# Patient Record
Sex: FEMALE | Race: BLACK OR AFRICAN AMERICAN
[De-identification: names, ages, dates, MRNs, and addresses within clinical notes are randomized per-mention and may not be internally consistent; named-entity substitution may affect disease eponyms.]

---

## 2019-07-06 ENCOUNTER — HOSPITAL ENCOUNTER (EMERGENCY)
Dept: HOSPITAL 53 - M ED | Age: 24
LOS: 1 days | Discharge: HOME | End: 2019-07-07
Payer: MEDICAID

## 2019-07-06 VITALS — BODY MASS INDEX: 19.96 KG/M2 | HEIGHT: 62 IN | WEIGHT: 108.47 LBS

## 2019-07-06 DIAGNOSIS — Z3A.10: ICD-10-CM

## 2019-07-06 DIAGNOSIS — O26.891: Primary | ICD-10-CM

## 2019-07-06 DIAGNOSIS — R10.2: ICD-10-CM

## 2019-07-06 LAB
B-HCG SERPL-ACNC: (no result) MIU/ML
BASOPHILS # BLD AUTO: 0 10^3/UL (ref 0–0.2)
BASOPHILS NFR BLD AUTO: 0.4 % (ref 0–1)
BUN SERPL-MCNC: 8 MG/DL (ref 7–18)
CALCIUM SERPL-MCNC: 8 MG/DL (ref 8.5–10.1)
CHLORIDE SERPL-SCNC: 106 MEQ/L (ref 98–107)
CO2 SERPL-SCNC: 24 MEQ/L (ref 21–32)
CREAT SERPL-MCNC: 0.59 MG/DL (ref 0.55–1.3)
EOSINOPHIL # BLD AUTO: 0.1 10^3/UL (ref 0–0.5)
EOSINOPHIL NFR BLD AUTO: 0.7 % (ref 0–3)
GFR SERPL CREATININE-BSD FRML MDRD: > 60 ML/MIN/{1.73_M2} (ref 60–?)
GLUCOSE SERPL-MCNC: 77 MG/DL (ref 70–100)
HCT VFR BLD AUTO: 31.7 % (ref 36–47)
HGB BLD-MCNC: 11.2 G/DL (ref 12–15.5)
LYMPHOCYTES # BLD AUTO: 3 10^3/UL (ref 1.5–6.5)
LYMPHOCYTES NFR BLD AUTO: 30.8 % (ref 24–44)
MCH RBC QN AUTO: 31.7 PG (ref 27–33)
MCHC RBC AUTO-ENTMCNC: 35.3 G/DL (ref 32–36.5)
MCV RBC AUTO: 89.8 FL (ref 80–96)
MONOCYTES # BLD AUTO: 0.7 10^3/UL (ref 0–0.8)
MONOCYTES NFR BLD AUTO: 7 % (ref 0–5)
NEUTROPHILS # BLD AUTO: 5.9 10^3/UL (ref 1.8–7.7)
NEUTROPHILS NFR BLD AUTO: 60.8 % (ref 36–66)
PLATELET # BLD AUTO: 177 10^3/UL (ref 150–450)
POTASSIUM SERPL-SCNC: 3.9 MEQ/L (ref 3.5–5.1)
RBC # BLD AUTO: 3.53 10^6/UL (ref 4–5.4)
SODIUM SERPL-SCNC: 137 MEQ/L (ref 136–145)
WBC # BLD AUTO: 9.8 10^3/UL (ref 4–10)

## 2019-07-06 PROCEDURE — 86900 BLOOD TYPING SEROLOGIC ABO: CPT

## 2019-07-06 PROCEDURE — 84702 CHORIONIC GONADOTROPIN TEST: CPT

## 2019-07-06 PROCEDURE — 87086 URINE CULTURE/COLONY COUNT: CPT

## 2019-07-06 PROCEDURE — 81001 URINALYSIS AUTO W/SCOPE: CPT

## 2019-07-06 PROCEDURE — 99284 EMERGENCY DEPT VISIT MOD MDM: CPT

## 2019-07-06 PROCEDURE — 86901 BLOOD TYPING SEROLOGIC RH(D): CPT

## 2019-07-06 PROCEDURE — 85025 COMPLETE CBC W/AUTO DIFF WBC: CPT

## 2019-07-06 PROCEDURE — 86850 RBC ANTIBODY SCREEN: CPT

## 2019-07-06 PROCEDURE — 80048 BASIC METABOLIC PNL TOTAL CA: CPT

## 2019-07-06 PROCEDURE — 36415 COLL VENOUS BLD VENIPUNCTURE: CPT

## 2019-07-06 PROCEDURE — 76801 OB US < 14 WKS SINGLE FETUS: CPT

## 2019-07-06 NOTE — REPVR
EXAM: 

 US Pregnancy First Trimester, Transabdominal 



EXAM DATE/TIME: 

 7/6/2019 10:14 PM 



CLINICAL HISTORY: 

 23 years old, female; Pregnancy complicated by abdominal or pelvic pain; 

Lower; First trimester; Gestational age or lmp: 10w 1d; Pregnant; Patient HX: 

Patient states only cramping, no vaginal bleeding 



TECHNIQUE: 

 Imaging protocol: Real-time transabdominal obstetrical ultrasound of the 

maternal pelvis and a first trimester pregnancy, less than 14 weeks 0 days, 

with image documentation. 



COMPARISON: 

 No relevant prior studies available. 



FINDINGS: 



GESTATION: 

 Gestation: An intrauterine gestational sac is identified on both sagittal 

images and transverse images. 

  Placenta: There is good decidual reaction with no evidence of subchorionic 

hemorrhage. 

 Amniotic fluid: Amniotic  fluid is normal for gestational age. 

 Abdomen: The urinary bladder is empty and cannot be evaluated. 



BIOMETRY: 

 Estimated gestational age: The fetal pole is identified and estimated at 10 

weeks 1 day. There is good cardiac activity of the fetus 171 beats per minute. 

This was a transabdominal ultrasound only. This is very early in gestation and 

all parameters regarding the fetus cannot be assessed. The patient should have 

a fetal survey at 18- 20 weeks gestation for complete fetal evaluation. 

 Right adnexa: The right ovary measures 2.8 CM in length by 2.1 CM in 

thickness. There is vascular flow of the right ovary. 

 Left adnexa: The left ovary measures 3.4 CM in length x 1.8 CM in thickness. 

There is vascular flow the left ovary. 

 Intraperitoneal: There is no evidence of free fluid in the pelvis. 



IMPRESSION: 

1. An intrauterine gestational sac is present. 

2. A fetal pole is identified and estimated at 10 weeks 1 day. This is very 

early in gestation and all fetal parameters cannot be assessed therefore a 

complete fetal survey should be performed at 18-20 weeks gestation. 



Electronically signed by: Stevan Haskins On 07/06/2019  22:38:31 PM

## 2019-07-07 VITALS — SYSTOLIC BLOOD PRESSURE: 118 MMHG | DIASTOLIC BLOOD PRESSURE: 86 MMHG

## 2019-10-11 ENCOUNTER — HOSPITAL ENCOUNTER (EMERGENCY)
Dept: HOSPITAL 53 - M ED | Age: 24
Discharge: HOME | End: 2019-10-11
Payer: COMMERCIAL

## 2019-10-11 VITALS — DIASTOLIC BLOOD PRESSURE: 62 MMHG | SYSTOLIC BLOOD PRESSURE: 113 MMHG

## 2019-10-11 VITALS — HEIGHT: 63 IN | BODY MASS INDEX: 21.64 KG/M2 | WEIGHT: 122.14 LBS

## 2019-10-11 DIAGNOSIS — M79.661: Primary | ICD-10-CM

## 2019-10-22 ENCOUNTER — HOSPITAL ENCOUNTER (OUTPATIENT)
Dept: HOSPITAL 53 - M LAB REF | Age: 24
End: 2019-10-22
Attending: ADVANCED PRACTICE MIDWIFE
Payer: COMMERCIAL

## 2019-10-22 DIAGNOSIS — N92.5: ICD-10-CM

## 2019-10-22 DIAGNOSIS — N76.0: Primary | ICD-10-CM

## 2020-12-23 ENCOUNTER — HOSPITAL ENCOUNTER (OUTPATIENT)
Dept: HOSPITAL 53 - M LABSMTC | Age: 25
End: 2020-12-23
Attending: PEDIATRICS
Payer: SELF-PAY

## 2020-12-23 DIAGNOSIS — Z11.59: Primary | ICD-10-CM

## 2021-01-16 ENCOUNTER — HOSPITAL ENCOUNTER (OUTPATIENT)
Dept: HOSPITAL 53 - M LABSMTC | Age: 26
End: 2021-01-16
Attending: PEDIATRICS
Payer: SELF-PAY

## 2021-01-16 DIAGNOSIS — Z20.822: Primary | ICD-10-CM

## 2021-02-04 ENCOUNTER — HOSPITAL ENCOUNTER (OUTPATIENT)
Dept: HOSPITAL 53 - M LAB REF | Age: 26
End: 2021-02-04
Attending: OBSTETRICS & GYNECOLOGY
Payer: COMMERCIAL

## 2021-02-04 DIAGNOSIS — Z34.83: Primary | ICD-10-CM
